# Patient Record
Sex: FEMALE | Race: WHITE | NOT HISPANIC OR LATINO | Employment: FULL TIME | URBAN - METROPOLITAN AREA
[De-identification: names, ages, dates, MRNs, and addresses within clinical notes are randomized per-mention and may not be internally consistent; named-entity substitution may affect disease eponyms.]

---

## 2017-02-24 ENCOUNTER — INITIAL CONSULT (OUTPATIENT)
Dept: OTOLARYNGOLOGY | Facility: CLINIC | Age: 62
End: 2017-02-24
Payer: COMMERCIAL

## 2017-02-24 ENCOUNTER — CLINICAL SUPPORT (OUTPATIENT)
Dept: SPEECH THERAPY | Facility: HOSPITAL | Age: 62
End: 2017-02-24
Attending: OTOLARYNGOLOGY
Payer: COMMERCIAL

## 2017-02-24 VITALS
DIASTOLIC BLOOD PRESSURE: 77 MMHG | TEMPERATURE: 98 F | SYSTOLIC BLOOD PRESSURE: 144 MMHG | HEART RATE: 67 BPM | WEIGHT: 127 LBS

## 2017-02-24 DIAGNOSIS — R49.9 VOICE DISTURBANCE: ICD-10-CM

## 2017-02-24 DIAGNOSIS — F44.4 HYPERFUNCTIONAL DYSPHONIA: ICD-10-CM

## 2017-02-24 DIAGNOSIS — F44.4 HYPERFUNCTIONAL DYSPHONIA: Primary | ICD-10-CM

## 2017-02-24 PROCEDURE — G9171 VOICE CURRENT STATUS: HCPCS | Mod: GN,CK | Performed by: SPEECH-LANGUAGE PATHOLOGIST

## 2017-02-24 PROCEDURE — 99243 OFF/OP CNSLTJ NEW/EST LOW 30: CPT | Mod: 25,S$GLB,, | Performed by: OTOLARYNGOLOGY

## 2017-02-24 PROCEDURE — G9172 VOICE GOAL STATUS: HCPCS | Mod: GN,CH | Performed by: SPEECH-LANGUAGE PATHOLOGIST

## 2017-02-24 PROCEDURE — 99999 PR PBB SHADOW E&M-NEW PATIENT-LVL III: CPT | Mod: PBBFAC,,, | Performed by: OTOLARYNGOLOGY

## 2017-02-24 PROCEDURE — 92524 BEHAVRAL QUALIT ANALYS VOICE: CPT | Mod: GN | Performed by: SPEECH-LANGUAGE PATHOLOGIST

## 2017-02-24 PROCEDURE — G9173 VOICE D/C STATUS: HCPCS | Mod: GN,CH | Performed by: SPEECH-LANGUAGE PATHOLOGIST

## 2017-02-24 PROCEDURE — 31579 LARYNGOSCOPY TELESCOPIC: CPT | Mod: S$GLB,,, | Performed by: OTOLARYNGOLOGY

## 2017-02-24 RX ORDER — ZOLPIDEM TARTRATE 10 MG/1
10 TABLET ORAL ONCE
COMMUNITY

## 2017-02-24 RX ORDER — CYCLOBENZAPRINE HCL 5 MG
5 TABLET ORAL 3 TIMES DAILY PRN
COMMUNITY

## 2017-02-24 RX ORDER — SUCRALFATE 1 G/1
1 TABLET ORAL
Refills: 3 | COMMUNITY
Start: 2017-01-06

## 2017-02-24 RX ORDER — PROPRANOLOL HYDROCHLORIDE 40 MG/1
40 TABLET ORAL 2 TIMES DAILY
Refills: 1 | COMMUNITY
Start: 2017-02-06

## 2017-02-24 RX ORDER — MUPIROCIN 20 MG/G
OINTMENT TOPICAL
Refills: 1 | COMMUNITY
Start: 2016-12-21

## 2017-02-24 RX ORDER — TRAMADOL HYDROCHLORIDE 50 MG/1
TABLET ORAL
Refills: 1 | COMMUNITY
Start: 2017-01-04

## 2017-02-24 RX ORDER — PROPRANOLOL HYDROCHLORIDE 20 MG/1
40 TABLET ORAL 3 TIMES DAILY
COMMUNITY
End: 2017-02-24 | Stop reason: DRUGHIGH
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
## 2017-02-24 NOTE — MR AVS SNAPSHOT
[facility]  [address]  [facility] [address]  Phone: [phone]  Fax: [phone]                  Nicky Noel   2017 10:20 AM   Initial consult    Description:  Female : 1955   Provider:  Heraclio Edmonds MD   Department:  [facility]           Reason for Visit     Consult           Diagnoses this Visit        Comments    Hyperfunctional dysphonia    -  Primary     Voice disturbance                To Do List           Future Appointments        Provider Department Dept Phone    2017 2:00 PM Carolyn Santamaria CCC-SLP [facility] [phone]      Goals (5 Years of Data)     None      Follow-Up and Disposition     Return if symptoms worsen or fail to improve.      [facility]     [facility] -  Assistance  Registered nurses in the [facility] provide clinical advisement, health education, appointment booking, and other advisory services.  Call for this free service at [phone].             Medications           Message regarding Medications     Verify the changes and/or additions to your medication regime listed below are the same as discussed with your clinician today.  If any of these changes or additions are incorrect, please notify your healthcare provider.             Verify that the below list of medications is an accurate representation of the medications you are currently taking.  If none reported, the list may be blank. If incorrect, please contact your healthcare provider. Carry this list with you in case of emergency.           Current Medications     cyclobenzaprine (FLEXERIL) 5 MG tablet Take 5 mg by mouth 3 (three) times daily as needed for Muscle spasms.    mupirocin (BACTROBAN) 2 % ointment APPLY TO THE AFFECTED AREA(S) TWICE DAILY    propranolol (INDERAL) 40 MG tablet Take 40 mg by mouth 2 (two) times daily.    ranitidine (ZANTAC) 300 MG tablet  Take 300 mg by mouth 2 (two) times daily.    sucralfate (CARAFATE) 1 gram tablet Take 1 g by mouth 4 (four) times daily before meals and nightly.    tramadol (ULTRAM) 50 mg tablet TAKE 1 OR 2 TABLETS BY MOUTH TWICE DAILY AS NEEDED FOR PAIN (MAX OF 6 DAILY)    zolpidem (AMBIEN) 10 mg Tab Take 10 mg by mouth once.           Clinical Reference Information           Your Vitals Were     BP                   144/77           Blood Pressure          Most Recent Value    BP  (!)  144/77      Allergies as of 2/24/2017     Codeine      Immunizations Administered on Date of Encounter - 2/24/2017     None      Orders Placed During Today's Visit     Future Labs/Procedures Expected by Expires    SLP voice evaluation  As directed 2/24/2018      [facility] Sign-Up     Activating your [facility] account is as easy as 1-2-3!     1) Visit [email or web address], select Sign Up Now, enter this activation code and your date of birth, then select Next.  [email or web address]  Expires: 4/10/2017 11:08 AM      2) Create a username and password to use when you visit [facility] in the future and select a security question in case you lose your password and select Next.    3) Enter your e-mail address and click Sign Up!    Additional Information  If you have questions, please e-mail [email or web address] or call [phone] to talk to our [facility] staff. Remember, [facility] is NOT to be used for urgent needs. For medical emergencies, dial 911.         Instructions      MUSCLE TENSION DYSPHONIA     What is MTD?     Muscle tension dysphonia (MTD) is a condition in which laryngeal muscles are overactive, causing voice fatigue and discomfort with use. Sometimes MTD accompanies another organic condition, but it can also occur on its own. The overactivity of the muscles can result in incomplete closure of the vocal folds. Sometimes, the false vocal folds are overactive as well.     MTD is caused by over-activity in some laryngeal muscles, which is sometimes  caused by the underactivity of other muscles. If there is an underlying condition, like nodules or an upper respiratory infection, the overcompensation to produce voice becomes a learned pattern.     How is MTD treated?     Voice therapy is the first line of treatment for MTD. There are a wide variety of treatment approaches, including laryngeal massage.                           WHAT TO EXPECT FROM VOICE THERAPY    Purpose  The purpose of voice therapy is to help you find a better, more efficient way to use your voice or to reduce symptoms such as coughing, throat clearing, or difficulty breathing.  Depending on your symptoms, you may learn how to produce clearer voice quality, how to reduce fatigue or pain associated with speaking, how to take care of your voice, and how to eliminate chronic coughing or throat clearing.      Process: Evaluation  First, you may go through some initial testing.  In most cases, a videostroboscopy will be performed in order to allow your speech pathologist and your physician to look at your vocal cords to aid in deciding if you would benefit from voice therapy.  Next, you may work with the speech pathologist to assess the current capabilities of your voice.  Following evaluation, your speech pathologist will design a therapeutic plan to improve your voice as well as other symptoms that may bother you.  At the time of evaluation, your speech pathologist may provide you with exercises to try at home.      Process: Therapy  Most patients benefit from 2-8 sessions over 1-3 months.  Voice therapy involves changing the behavior of your vocal cords and speaking habits, so it is very important that you attend your appointments and do home practices as instructed by your speech pathologist.  Home practice and participation in therapy are critical to meeting your desired voice goals, so of course, we are able to work with you to schedule appointments that are convenient for you.              Language Assistance Services     ATTENTION: Language assistance services are available, free of charge. Please call [phone].      ATENCIÓN: Si habla zofia, tiene a staton disposición servicios gratuitos de asistencia lingüística. Llame al [phone].     CHÚ Ý: N?u b?n nói Ti?ng Vi?t, có các d?ch v? h? tr? ngôn ng? mi?n phí dành cho b?n. G?i s? [phone].         [facility] complies with applicable Federal civil rights laws and does not discriminate on the basis of race, color, national origin, age, disability, or sex.

## 2017-02-24 NOTE — PROGRESS NOTES
Referring provider: Dr. Heraclio Edmonds  Reason for visit:  Behavioral and qualitative analysis of voice and resonance (CPT 68519)    Subjective / History    Nicky Noel is a 61 y.o. female referred for voice evaluation (CPT 58114) by Dr. Edmonds.  She presents with complaints of hoarseness, voice loss, and neck tightness which began about 1 year ago associated with onset of URI.  The patient also endorses: voice worse in afternoon/evening, fatigue with use, pain with use, tightness and reduced volume.  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  She has attempted some elimination diets, including dairy, but did not derive significant benefit.  Dr. Gonzales treated her for oral thrush, which helped for a little while, but then her hoarse voice quality returned.  At this point, she reports she only has a voice in the morning, and it gets worse especially when she yells or coughs.    Swallowing: no c/o   Breathing: conversational dyspnea    Smokin packs/day   Reflux: no    Stroboscopy findings (per Dr. Edmonds on 17): no masses; symmetric movement; normal mucosa    No past medical history on file.  Current Outpatient Prescriptions on File Prior to Visit   Medication Sig Dispense Refill    cyclobenzaprine (FLEXERIL) 5 MG tablet Take 5 mg by mouth 3 (three) times daily as needed for Muscle spasms.      mupirocin (BACTROBAN) 2 % ointment APPLY TO THE AFFECTED AREA(S) TWICE DAILY  1    propranolol (INDERAL) 40 MG tablet Take 40 mg by mouth 2 (two) times daily.  1    ranitidine (ZANTAC) 300 MG tablet Take 300 mg by mouth 2 (two) times daily.  5    sucralfate (CARAFATE) 1 gram tablet Take 1 g by mouth 4 (four) times daily before meals and nightly.  3    tramadol (ULTRAM) 50 mg tablet TAKE 1 OR 2 TABLETS BY MOUTH TWICE DAILY AS NEEDED FOR PAIN (MAX OF 6 DAILY)  1    zolpidem (AMBIEN) 10 mg Tab Take 10 mg by mouth once.      [DISCONTINUED] propranolol  "(INDERAL) 20 MG tablet Take 40 mg by mouth 3 (three) times daily.       No current facility-administered medications on file prior to visit.        Objective    Perceptual/behavioral assessment (improved at end of session to WNL)  -CAPE-V Overall Score: 70  -Quality: strained, breathy  -Volume: decreased projection  -Pitch: high F0  -Flexibility: diminished  -Habitual respiratory pattern: breath holding.    Acoustic/aerodynamic assessment  Multi-Dimensional Voice Program (MDVP) Analysis (sustained "ah")   Baseline Post-trial tx Gender-matched norms (F)   Average fundamental frequency (Fo) 214.144 Hz 169.32 Hz Norm/SD: 243.97 / 27.46   Relative average perturbation 4.34% 0.339% Norm/SD: 0.378 / 0.21   Shimmer percent 9.811% 3.258% Norm/SD: 1.997 / 0.79   Noise to harmonic ratio 0.171 0.133 Norm/SD: 0.112 / 0.01   Voice turbulence index 0.114 0.042 Norm/SD: 0.046 / 0.012     Education / Stimulability Trials   Discussed importance of vocal hygiene including: hydration and reducing throat clearing, coughing, other phonotraumatic behaviors. Patient was stimulable for improved voice using SOVT/cup bubble exercises.  She was led through cup bubble hierarchy (isolation - vowels - syllables - words - phrases - passages) while maintaining improved, frontal resonant voice.  By the end of the session, she was able to speak in conversational voice with overall improvement.  She was able to self-cue to improve when her voice deteriorated.  She was able to use both cup bubble and visualization techniques to improve her voice quality.  She was encouraged to use her voice extensively today in order to practice muscle memory and retain improved voice.  She was encouraged to use cup bubble daily until her "new voice" becomes "second nature".  She was amenable to all suggestions.      Functional goals  Length Status Goal   Long term Initiated  Patient and clinician will facilitate changes in vocal function in order to restore " functional use of voice for daily occupational, social, and emotional demands.    Long term Initiated  Patient will re-establish phonation with adequate balance of airflow and resonance with decreased muscle tension.    Long term Initiated   Patient will improve coordination of respiration and phonation for efficient vocal production at a conversational level.    Short term Initiated  Patient will complete SOVT exercises and/or resonant-focused exercises 3-5x daily to strengthen and balance the intrinsic laryngeal musculature and maximize glottic closure without medial hyperfunction.   Short term Initiated  Patient will improve the quality, efficiency, and ease of phonation through resonant and/or airflow exercises from the syllable to conversation level with 80% accuracy.   Short term Initiated  Patient will discriminate between easy and strained phonation with 80% accuracy.        Assessment     Nicky Noel presened with moderate-severe dysphonia secondary to hyperfunctional as diagnosed by Dr. Edmonds.  She made significant progress toward normal voice in session today.      G-Codes for Voice  Current status:   - CK   Projected status:  - CH   Discharge status:  - CH     Recommendations / POC    Recommend 1-2 follow up sessions of voice/speech therapy over 1-2 weeks with a speech-language pathologist to optimize glottal postures for improved vocal function, vocal efficiency, and ease of phonation.  She should continue the exercises as discussed in session and should contact me with any further questions.

## 2017-02-24 NOTE — LETTER
March 2, 2017      George Gonzales Jr., MD  [address] [address]           [facility]  [address]  [place] [address]  Phone: [phone]  Fax: [phone]          Patient: Nicky Noel   MR Number: [id number]   YOB: 1955   Date of Visit: 2/24/2017       Dear Dr. George Gonzales Jr.:    Thank you for referring Nicky Noel to me for evaluation. Attached you will find relevant portions of my assessment and plan of care.    If you have questions, please do not hesitate to call me. I look forward to following Nicky Noel along with you.    Sincerely,    Heraclio Edmonds MD    Enclosure  CC:  Suraj Mart    If you would like to receive this communication electronically, please contact [email or web address] or [phone] to request more information on [email or web address] Link access.    For providers and/or their staff who would like to refer a patient to [facility], please contact us through our one-stop-shop provider referral line, [facility], at [phone].    If you feel you have received this communication in error or would no longer like to receive these types of communications, please e-mail [email or web address]